# Patient Record
Sex: FEMALE | Race: WHITE | NOT HISPANIC OR LATINO | Employment: OTHER | URBAN - METROPOLITAN AREA
[De-identification: names, ages, dates, MRNs, and addresses within clinical notes are randomized per-mention and may not be internally consistent; named-entity substitution may affect disease eponyms.]

---

## 2019-11-23 ENCOUNTER — HOSPITAL ENCOUNTER (EMERGENCY)
Facility: HOSPITAL | Age: 74
Discharge: HOME | End: 2019-11-23
Attending: SURGERY
Payer: COMMERCIAL

## 2019-11-23 VITALS
SYSTOLIC BLOOD PRESSURE: 142 MMHG | HEIGHT: 64 IN | RESPIRATION RATE: 18 BRPM | DIASTOLIC BLOOD PRESSURE: 63 MMHG | TEMPERATURE: 98 F | WEIGHT: 149 LBS | BODY MASS INDEX: 25.44 KG/M2 | OXYGEN SATURATION: 97 % | HEART RATE: 70 BPM

## 2019-11-23 DIAGNOSIS — K04.01 PULPITIS: ICD-10-CM

## 2019-11-23 DIAGNOSIS — K08.89 PAIN, DENTAL: Primary | ICD-10-CM

## 2019-11-23 PROCEDURE — 99283 EMERGENCY DEPT VISIT LOW MDM: CPT | Mod: ER

## 2019-11-23 RX ORDER — AMOXICILLIN 875 MG/1
875 TABLET, FILM COATED ORAL 2 TIMES DAILY
Qty: 14 TABLET | Refills: 0 | Status: SHIPPED | OUTPATIENT
Start: 2019-11-23

## 2019-11-24 NOTE — ED PROVIDER NOTES
Encounter Date: 11/23/2019       History     Chief Complaint   Patient presents with    Dental Pain     c/o right lower tooth pain that started a few days ago. Pain radiates into jaw. No facial swelling or fever.     73-year-old female presents to the emergency department for evaluation of 2 day history of right lower dental pain.  She reports that the symptoms began gradually 2 days ago and have been constant since onset.  She reports that it is an achy, throbbing pain from her 2 into her jaw.  She denies any facial swelling, fever, sore throat, difficulty swallowing, headache, dizziness or vision changes.  She reports that she has been taking Tylenol and Motrin intermittently with moderate pain relief.          Review of patient's allergies indicates:  No Known Allergies  Past Medical History:   Diagnosis Date    CAD (coronary artery disease)      Past Surgical History:   Procedure Laterality Date    APPENDECTOMY       History reviewed. No pertinent family history.  Social History     Tobacco Use    Smoking status: Never Smoker    Smokeless tobacco: Never Used   Substance Use Topics    Alcohol use: Never     Frequency: Never    Drug use: Never     Review of Systems   Constitutional: Negative for activity change, appetite change and fever.   HENT: Positive for dental problem. Negative for facial swelling, rhinorrhea, sinus pressure, sinus pain, sore throat, trouble swallowing and voice change.    Eyes: Negative for photophobia and visual disturbance.   Respiratory: Negative for cough and shortness of breath.    Cardiovascular: Negative for chest pain.   Gastrointestinal: Negative for abdominal pain, diarrhea, nausea and vomiting.   Genitourinary: Negative for decreased urine volume.   Musculoskeletal: Negative for back pain and neck pain.   Neurological: Negative for dizziness, syncope, weakness, light-headedness, numbness and headaches.       Physical Exam     Initial Vitals [11/23/19 1447]   BP Pulse Resp  Temp SpO2   (!) 142/63 70 18 98.3 °F (36.8 °C) 97 %      MAP       --         Physical Exam    Nursing note and vitals reviewed.  Constitutional: She appears well-developed and well-nourished. She is not diaphoretic. No distress.   HENT:   Head: Normocephalic and atraumatic.   Right Ear: External ear normal.   Left Ear: External ear normal.   Nose: Nose normal.   Mouth/Throat: Uvula is midline and oropharynx is clear and moist. Dental caries present. No dental abscesses or uvula swelling.       Eyes: Conjunctivae and EOM are normal. Pupils are equal, round, and reactive to light.   Neck: Normal range of motion. Neck supple.   Cardiovascular: Normal rate, regular rhythm and normal heart sounds.   Pulmonary/Chest: Breath sounds normal. No respiratory distress. She has no wheezes. She has no rhonchi. She has no rales. She exhibits no tenderness.   Lymphadenopathy:     She has no cervical adenopathy.   Neurological: She is alert and oriented to person, place, and time.   Skin: Skin is warm and dry.   Psychiatric: She has a normal mood and affect.         ED Course   Procedures  Labs Reviewed - No data to display       Imaging Results    None          Medical Decision Making:   Initial Assessment:   73-year-old female presents for evaluation of dental pain. Physical exam reveals a nontoxic-appearing female in no acute distress. Patient is afebrile vital signs within normal limits.  Neurological exam reveals an alert and oriented patient. Dental decay and caries noted throughout mouth.  Dental decay noted tooth number 31 with surrounding erythema and edema noted to gumline.  No drainable abscess noted. No facial swelling noted. No cervical adenopathy noted.  Neck is supple, no meningeal signs noted. Lungs clear to auscultation bilaterally.  Differential Diagnosis:   Acute pulpitis  Dental pain  No evidence of abscess  ED Management:  Discussed these findings at length with the patient who verbalizes understanding and  agreement course of treatment.  Instructed the patient to follow up with her dentist for re-evaluation and to return to the emergency department immediately for any new or worsening symptoms.                                 Clinical Impression:       ICD-10-CM ICD-9-CM   1. Pain, dental K08.89 525.9   2. Pulpitis K04.01 522.0                             Olivia Dalla PA-C  11/23/19 [id number]